# Patient Record
Sex: MALE | Race: OTHER | HISPANIC OR LATINO | URBAN - METROPOLITAN AREA
[De-identification: names, ages, dates, MRNs, and addresses within clinical notes are randomized per-mention and may not be internally consistent; named-entity substitution may affect disease eponyms.]

---
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
## 2021-08-05 NOTE — ED PROVIDER NOTE - NEUROLOGICAL, MLM
[address]  CDU / OBSERVATION ENCOUNTER  ATTENDING NOTE       I performed a history and physical examination of the patient and discussed management with the resident or midlevel provider. I reviewed the resident or midlevel provider's note and agree with the documented findings and plan of care. Any areas of disagreement are noted on the chart. I was personally present for the key portions of any procedures. I have documented in the chart those procedures where I was not present during the key portions. I have reviewed the nurses notes. I agree with the chief complaint, past medical history, past surgical history, allergies, medications, social and family history as documented unless otherwise noted below. The Family history, social history, and ROS are effectively unchanged since admission unless noted elsewhere in the chart. Patient transfer from [facility] for vascular surgery evaluation. Patient has been seen by vascular surgery and has not felt to be immediately surgical.  Vascular surgery clearing patient for discharge recommending outpatient follow-up. Patient stopped his unit for reassessment. Patient reassessed after several hours. Pain level down to 2 on a 1-10 scale. Patient eating. IV disconnected. Patient encouraged to ambulate. Will reassess patient again in a few hours. Possible discharge if doing well.     Tahir Kat MD  Attending Emergency  Physician
Alert and oriented, no focal deficits, no motor or sensory deficits.